# Patient Record
Sex: FEMALE | URBAN - METROPOLITAN AREA
[De-identification: names, ages, dates, MRNs, and addresses within clinical notes are randomized per-mention and may not be internally consistent; named-entity substitution may affect disease eponyms.]

---

## 2018-09-11 ENCOUNTER — APPOINTMENT (RX ONLY)
Dept: URBAN - METROPOLITAN AREA CLINIC 23 | Facility: CLINIC | Age: 48
Setting detail: DERMATOLOGY
End: 2018-09-11

## 2018-09-11 DIAGNOSIS — Z41.9 ENCOUNTER FOR PROCEDURE FOR PURPOSES OTHER THAN REMEDYING HEALTH STATE, UNSPECIFIED: ICD-10-CM

## 2018-09-11 PROCEDURE — ? DYSPORT

## 2018-09-11 PROCEDURE — ? FILLERS

## 2018-09-11 NOTE — PROCEDURE: FILLERS
Use Map Statement For Sites (Optional): No
Decollete Filler  Volume In Cc: 0
Lot #: [id number]
Additional Area 4 Location: lat brow using an acannula ,jawline and NLFs
Lot #: [id number]
Post-Care Instructions: Patient instructed to apply ice to reduce swelling.
Anesthesia Volume In Cc: 0.2
Detail Level: Zone
Additional Area 2 Location: Lateral mouth, Cheeks
Expiration Date (Month Year): 01/2021
Additional Area 1 Location: crows feet lines, Rt upper lip fine lines
Map Statment: See [address] for Complete Details
Additional Anesthesia Volume In Cc: 6
Price (Use Numbers Only, No Special Characters Or $): 0.00
Consent: Written consent obtained. Risks include but not limited to bruising, beading, irregular texture, ulceration, infection, allergic reaction, scar formation, incomplete augmentation, temporary nature, procedural pain.
Filler: Ayah Singh
Expiration Date (Month Year): 02/28/2021
Additional Area 3 Location: lateral eyes, nasal root
Additional Area 2 Volume In Cc: 1
Additional Area 5 Location: NLFs,tear trough  and lips
Lot #: [id number]
Additional Area 1 Location: nasalabial folds, marionette, diluted with 0.2 cc plain lidocaine to glabellar fine lines, forehead fine lines
Additional Area 2 Location: cheeks fine lines,Maria Dolores,using an acannula
Additional Area 2 Location: diluted injected fine lines lat brows and fine lines cheeks
Additional Area 1 Location: lateral mouth, fine lines perioral, marionette lines
Anesthesia Type: 1% lidocaine without epinephrine
Expiration Date (Month Year): 1/4/20

## 2018-09-11 NOTE — PROCEDURE: DYSPORT
Periorbital Skin Units: [address]
Forehead Units: 0
Additional Area 6 Location: chin
Consent: Written consent obtained. Risks include but not limited to lid/brow ptosis, bruising, swelling, diplopia, temporary effect, incomplete chemical denervation.
Additional Area 1 Units: 10
Additional Area 3 Location: neck bands
Dilution (U/ 0.1cc): 1.5
Expiration Date (Month Year): 04/2019
Lot #: [id number]
Additional Area 1 Location: lateral eyes
Detail Level: Simple
Price (Use Numbers Only, No Special Characters Or $): 0.00
Additional Area 2 Location: upper lip cutaneous
Glabellar Complex Units: 50
Additional Area 4 Location: high forehead

## 2019-04-18 ENCOUNTER — APPOINTMENT (RX ONLY)
Dept: URBAN - METROPOLITAN AREA CLINIC 23 | Facility: CLINIC | Age: 49
Setting detail: DERMATOLOGY
End: 2019-04-18

## 2019-04-18 DIAGNOSIS — Z41.9 ENCOUNTER FOR PROCEDURE FOR PURPOSES OTHER THAN REMEDYING HEALTH STATE, UNSPECIFIED: ICD-10-CM

## 2019-04-18 PROCEDURE — ? FILLERS

## 2019-04-18 PROCEDURE — ? PULSED-DYE LASER

## 2019-04-18 PROCEDURE — ? DYSPORT

## 2019-04-18 PROCEDURE — ? IN-HOUSE DISPENSING PHARMACY

## 2019-04-18 ASSESSMENT — LOCATION SIMPLE DESCRIPTION DERM
LOCATION SIMPLE: RIGHT CHEEK
LOCATION SIMPLE: LEFT CHEEK

## 2019-04-18 ASSESSMENT — LOCATION DETAILED DESCRIPTION DERM
LOCATION DETAILED: RIGHT LATERAL MALAR CHEEK
LOCATION DETAILED: RIGHT CENTRAL BUCCAL CHEEK
LOCATION DETAILED: LEFT MEDIAL BUCCAL CHEEK
LOCATION DETAILED: LEFT LATERAL MALAR CHEEK

## 2019-04-18 ASSESSMENT — LOCATION ZONE DERM: LOCATION ZONE: FACE

## 2019-04-18 NOTE — PROCEDURE: IN-HOUSE DISPENSING PHARMACY
Product 41 Refills: 0
Product 47 Unit Type: mg
Product 6 Amount/Unit (Numbers Only): 1
Product 2 Price/Unit (In Dollars): 0.00
Product 7 Unit Type: ml
Name Of Product 1: Hydroquinone 4% / Tretinoin 0.05% / Fluocinolone 0.01%
Name Of Product 10: Valtrex 500 mg
Product 5 Unit Type: tablets
Product 3 Application Directions: Apply a pea size amount to the entire face at night
Name Of Product 8: TNS Essential
Name Of Product 6: Prednisone 10mg
Product 12 Application Directions: Valtrex 500mg
Product 11 Application Directions: Apply a thin layer to the acne prone areas in the AM
Name Of Product 5: loratadine 10 mg
Send Charges To Patient Encounter: No
Product 2 Unit Type: jar(s)
Product 9 Application Directions: Apply to the acne prone areas in the Am and Pm
Product 11 Unit Type: bottle(s)
Name Of Product 3: Tretinoin 0.05% cream
Product 7 Application Directions: Apply to lashes at bedtime daily as indicated
Product 5 Application Directions: Take one tablet today after procedure with full glass of water as indicated
Name Of Product 2: brightening pads 6 %
Product 21 Application Directions: Take one tablet 30 minutes prior to procedure
Product 7 Amount/Unit (Numbers Only): 5
Name Of Product 11: Clearing gel
Product 4 Application Directions: Take one tablet half hour today prior to procedure as indicated.  Dispense in office
Name Of Product 9: Clearing tone pads
Product 12 Amount/Unit (Numbers Only): 6
Product 3 Amount/Unit (Numbers Only): [address] [facility]
Name Of Product 7: Latisse
Detail Level: Zone
Product 2 Application Directions: apply to affected areas left lower leg am and pm  as indicated
Product 3 Unit Type: grams
Name Of Product 21: Valium 5mg
Product 1 Application Directions: Apply thin layer to affected area to the face every night  at bedtime only.
Name Of Product 19: Diazepam 5mg
Name Of Product 4: Valium 5 mg
Product 8 Application Directions: Apply to the clean face in the AM and PM daily
Product 1 Unit Type: tube(s)
Product 6 Application Directions: Take one tablet for swelling tomorrow am as indicated

## 2019-04-18 NOTE — PROCEDURE: DYSPORT
Additional Area 6 Units: 0
Additional Area 2 Location: lateral brows
Additional Area 5 Location: chin
Consent: Written consent obtained. Risks include but not limited to lid/brow ptosis, bruising, swelling, diplopia, temporary effect, incomplete chemical denervation.
Expiration Date (Month Year): 10/31/2019
Periorbital Skin Units: [address] [address]
Additional Area 1 Units: 30
Nasal Root Units: 10
Additional Area 4 Location: lateral eyes
Lot #: [id number]
Dilution (U/ 0.1cc): 1.5
Additional Area 1 Location: forehead 2.5cm above brow
Price (Use Numbers Only, No Special Characters Or $): 0.00
Additional Area 3 Location: glabella
Glabellar Complex Units: 48
Detail Level: Simple

## 2019-04-18 NOTE — PROCEDURE: PULSED-DYE LASER
Delay Time (Ms): [facility]
Cryogen Time (Ms): [address] [address] Daigle
Pulse Duration: 10 ms
Fluence In J/Cm2 (Optional): 7.00
Post-Care Instructions: I reviewed with the patient in detail post-care instructions. Patient should stay away from the sun and wear sun protection until treated areas are fully healed.
Delay Time (Ms): 10
Cryogen Time (Ms): 30
Post-Procedure Care: Sunscreen applied. Post care reviewed with patient.
Spot Size: 10 mm
Spot Size: 10x3 mm
Pulse Duration: 6 ms
Delay Time (Ms): 20
Laser Type: Vbeam 595nm
Pulse Duration: 20 ms
Consent: Written consent obtained, risks reviewed including but not limited to crusting, scabbing, blistering, scarring, darker or lighter pigmentary change, incidental hair removal, bruising, and/or incomplete removal.
Spot Size: 7 mm
Fluence In J/Cm2 (Optional): 6.5 J/cm2
Price (Use Numbers Only, No Special Characters Or $): 0.00
Treated Area: small area
Fluence In J/Cm2 (Optional): 10.0
Detail Level: Zone

## 2019-04-18 NOTE — PROCEDURE: FILLERS
Additional Area 2 Volume In Cc: 0
Additional Area 1 Volume In Cc: 1
Price (Use Numbers Only, No Special Characters Or $): 0.00
Additional Area 2 Location: lateral mouth fine lines, marionette fine lines, glabellar fine lines, above the brow fine lines
Additional Area 3 Location: Glabbellar fine lines, Nasalabial folds, Marionette lines, vermillion lip
Use Map Statement For Sites (Optional): No
Filler: Ayah Singh
Additional Area 4 Location: Lateral Cheeks
Lot #: [id number]
Additional Area 4 Location: lateral jawline, lateral cheeks, oral commissure
Expiration Date (Month Year): 08/31/2019
Map Statment: See [address] for Complete Details
Lot #: [id number]
Additional Area 5 Location: Perioral
Include Cannula Information In Note?: Yes
Consent: Written consent obtained. Risks include but not limited to bruising, beading, irregular texture, ulceration, infection, allergic reaction, scar formation, incomplete augmentation, temporary nature, procedural pain.
Expiration Date (Month Year): 08/2021
Lot #: [id number]
Expiration Date (Month Year): 6/30/21
Post-Care Instructions: Patient instructed to apply ice to reduce swelling.
Anesthesia Type: 1% lidocaine without epinephrine
Include Cannula Size?: 25G
Include Cannula Brand?: DermaSculpt
Additional Area 1 Location: lateral mouth, fine lines perioral, marionette lines, lateral cheeks, vermillion lips
Additional Area 1 Location: cheeks and fine lines
Additional Area 2 Location: Nasalabial, Glabellar fine lines- Complimentary
Include Cannula Length?: 1.5 inch
Detail Level: Zone
Additional Area 2 Location: Lips, oral commissure, glabellar fine fines
Additional Area 1 Location: upper lip fine lines, lateral mouth, lateral jawline, lateral cheeks, NLFs
Additional Anesthesia Volume In Cc: 6

## 2024-09-05 ENCOUNTER — APPOINTMENT (RX ONLY)
Dept: URBAN - METROPOLITAN AREA CLINIC 98 | Facility: CLINIC | Age: 54
Setting detail: DERMATOLOGY
End: 2024-09-05

## 2024-09-05 DIAGNOSIS — Z41.9 ENCOUNTER FOR PROCEDURE FOR PURPOSES OTHER THAN REMEDYING HEALTH STATE, UNSPECIFIED: ICD-10-CM

## 2024-09-05 PROCEDURE — ? FILLERS

## 2024-09-05 PROCEDURE — ? DYSPORT

## 2024-09-05 NOTE — PROCEDURE: DYSPORT
Additional Area 2 Units: 50
L Brow Units: 0
Show Right And Left Pupillary Line Units: No
Consent: Written consent obtained. Risks include but not limited to lid/brow ptosis, bruising, swelling, diplopia, temporary effect, incomplete chemical denervation.
Additional Area 3 Location: Chin
Post-Care Instructions: Patient instructed to not lie down for 4 hours, limit physical activity for 24 hours and avoid manipulation of the treated areas.
Show Depressor Anguli Units: Yes
Detail Level: Zone
Expiration Date (Month Year): 2022-10
Additional Area 6 Location: neck bands
Periorbital Skin Units: 30
Additional Area 1 Location: forehead 2.5 cm above brow
Additional Area 4 Location: Neckbands
Show Inventory Tab: Show
Lot #: [id number]
Dilution (U/0.1 Cc): 1.5
Additional Area 5 Location: Masseters

## 2024-09-05 NOTE — PROCEDURE: FILLERS
Mid Face Filler Volume In Cc: 0
Number Of Syringes (Required For Inventory): 1
Additional Area 1 Location: marionette lines, lips, chin, cheeks
Include Cannula Length?: 1.5 inch
Map Statment: See Attach Map for Complete Details
Additional Area 4 Location: Perioral lines, marionette lines, vermillion lips, chin
Inventory Information: This plan will send filler information to inventory based on the fillers you select. Multiple fillers can be sent but you must ensure you select the appropriate fillers in the inventory tab.
Additional Area 2 Location: vermillion lips, oral commissures, chin, and perioral fine lines
Detail Level: Detailed
Additional Area 5 Location: chin
Include Cannula Information In Note?: Yes
Additional Area 3 Location: cheeks, jawline
Anesthesia Volume In Cc: 0.5
Additional Area 1 Location: cheek fine lines
Additional Area 4 Location: dian oral
Show Inventory Tab: Show
Include Cannula Information In Note?: No
Additional Area 5 Location: ear lobes
Additional Area 3 Location: jong
Include Cannula Brand?: DermaSculpt
Additional Area 1 Location: lateral jaw
Consent: Written consent obtained. Risks include but not limited to bruising, beading, irregular texture, ulceration, infection, allergic reaction, scar formation, incomplete augmentation, temporary nature, procedural pain.
Additional Area 1 Location: nlfs, cheeks, chin, and marionette lines
Post-Care Instructions: Patient instructed to apply ice to reduce swelling.
Additional Area 2 Location: dian oral fine lines
Filler: Ayah Singh
Additional Area 3 Location: Nlfs, marionette lines,oral commisures
Include Cannula Size?: 25G